# Patient Record
(demographics unavailable — no encounter records)

---

## 2024-12-02 NOTE — HISTORY OF PRESENT ILLNESS
[FreeTextEntry1] : 12 y/o with B/L pain. sporadic remote trauma  No fever, rash, or recent illness.  No joint pain/swelling/stiffness.  No eye pain/redness/change in vision.  No sores in the mouth or nose.  No difficulty swallowing.  No chest pain or shortness of breath.  No abdominal complaints or weight loss.  No weakness.  No headaches or focal neurological deficits.  No urinary changes.  No other new symptoms.

## 2025-06-09 NOTE — ASSESSMENT
[FreeTextEntry1] : Stretch before and after exercising. NOF of both legs look the same, posterior medial area of the femur. Recommended strengthening muscles in order to improve posture. Follow up PRN.

## 2025-06-09 NOTE — HISTORY OF PRESENT ILLNESS
[FreeTextEntry1] : 10 y/o female here with bl growing pains of both thighs. Primarily the front of the left side. Patient's mom also has concerns of poor posture.

## 2025-06-09 NOTE — DATA REVIEWED
[Normal] : X-ray revealed no displaced fractures, dislocations or other abnormalities. [de-identified] : 2 views of BL femur reviewed.

## 2025-06-09 NOTE — DATA REVIEWED
[Normal] : X-ray revealed no displaced fractures, dislocations or other abnormalities. [de-identified] : 2 views of BL femur reviewed.